# Patient Record
Sex: FEMALE | Race: WHITE | Employment: UNEMPLOYED | URBAN - METROPOLITAN AREA
[De-identification: names, ages, dates, MRNs, and addresses within clinical notes are randomized per-mention and may not be internally consistent; named-entity substitution may affect disease eponyms.]

---

## 2017-06-12 ENCOUNTER — TELEPHONE (OUTPATIENT)
Dept: SURGERY | Age: 62
End: 2017-06-12

## 2017-06-20 ENCOUNTER — OFFICE VISIT (OUTPATIENT)
Dept: FAMILY MEDICINE CLINIC | Age: 62
End: 2017-06-20

## 2017-06-20 VITALS
TEMPERATURE: 98.2 F | BODY MASS INDEX: 47.66 KG/M2 | HEIGHT: 63 IN | DIASTOLIC BLOOD PRESSURE: 66 MMHG | WEIGHT: 269 LBS | OXYGEN SATURATION: 98 % | SYSTOLIC BLOOD PRESSURE: 115 MMHG | HEART RATE: 68 BPM | RESPIRATION RATE: 18 BRPM

## 2017-06-20 DIAGNOSIS — E66.01 MORBID OBESITY DUE TO EXCESS CALORIES (HCC): ICD-10-CM

## 2017-06-20 DIAGNOSIS — E11.9 CONTROLLED TYPE 2 DIABETES MELLITUS WITHOUT COMPLICATION, WITHOUT LONG-TERM CURRENT USE OF INSULIN (HCC): ICD-10-CM

## 2017-06-20 DIAGNOSIS — I10 ESSENTIAL HYPERTENSION, BENIGN: Primary | ICD-10-CM

## 2017-06-20 DIAGNOSIS — R00.2 PALPITATIONS: ICD-10-CM

## 2017-06-20 DIAGNOSIS — Z13.31 DEPRESSION SCREEN: ICD-10-CM

## 2017-06-20 RX ORDER — OLMESARTAN MEDOXOMIL AND HYDROCHLOROTHIAZIDE 20/12.5 20; 12.5 MG/1; MG/1
1 TABLET ORAL DAILY
Qty: 90 TAB | Refills: 1 | Status: SHIPPED | OUTPATIENT
Start: 2017-06-20 | End: 2017-11-14

## 2017-06-20 NOTE — PATIENT INSTRUCTIONS
Please engage in regular exercise. Moderate intensity aerobic exercise  (60-70% maximal heart rate) for at least 150 minutes per week spread over a minimum of 3 days. Do not take more than 2 days off from exercising. Activities such as brisk walking, jogging, bicycling and swimming. Please also engage in weight resistance exercises for at least 20 minutes twice a week. Keep a low carbohydrate diet. 40% of your daily caloric intake. Also a high fiber diet of 30 grams daily. DASH Diet: Care Instructions  Your Care Instructions  The DASH diet is an eating plan that can help lower your blood pressure. DASH stands for Dietary Approaches to Stop Hypertension. Hypertension is high blood pressure. The DASH diet focuses on eating foods that are high in calcium, potassium, and magnesium. These nutrients can lower blood pressure. The foods that are highest in these nutrients are fruits, vegetables, low-fat dairy products, nuts, seeds, and legumes. But taking calcium, potassium, and magnesium supplements instead of eating foods that are high in those nutrients does not have the same effect. The DASH diet also includes whole grains, fish, and poultry. The DASH diet is one of several lifestyle changes your doctor may recommend to lower your high blood pressure. Your doctor may also want you to decrease the amount of sodium in your diet. Lowering sodium while following the DASH diet can lower blood pressure even further than just the DASH diet alone. Follow-up care is a key part of your treatment and safety. Be sure to make and go to all appointments, and call your doctor if you are having problems. It's also a good idea to know your test results and keep a list of the medicines you take. How can you care for yourself at home? Following the DASH diet  · Eat 4 to 5 servings of fruit each day.  A serving is 1 medium-sized piece of fruit, ½ cup chopped or canned fruit, 1/4 cup dried fruit, or 4 ounces (½ cup) of fruit juice. Choose fruit more often than fruit juice. · Eat 4 to 5 servings of vegetables each day. A serving is 1 cup of lettuce or raw leafy vegetables, ½ cup of chopped or cooked vegetables, or 4 ounces (½ cup) of vegetable juice. Choose vegetables more often than vegetable juice. · Get 2 to 3 servings of low-fat and fat-free dairy each day. A serving is 8 ounces of milk, 1 cup of yogurt, or 1 ½ ounces of cheese. · Eat 6 to 8 servings of grains each day. A serving is 1 slice of bread, 1 ounce of dry cereal, or ½ cup of cooked rice, pasta, or cooked cereal. Try to choose whole-grain products as much as possible. · Limit lean meat, poultry, and fish to 2 servings each day. A serving is 3 ounces, about the size of a deck of cards. · Eat 4 to 5 servings of nuts, seeds, and legumes (cooked dried beans, lentils, and split peas) each week. A serving is 1/3 cup of nuts, 2 tablespoons of seeds, or ½ cup of cooked beans or peas. · Limit fats and oils to 2 to 3 servings each day. A serving is 1 teaspoon of vegetable oil or 2 tablespoons of salad dressing. · Limit sweets and added sugars to 5 servings or less a week. A serving is 1 tablespoon jelly or jam, ½ cup sorbet, or 1 cup of lemonade. · Eat less than 2,300 milligrams (mg) of sodium a day. If you limit your sodium to 1,500 mg a day, you can lower your blood pressure even more. Tips for success  · Start small. Do not try to make dramatic changes to your diet all at once. You might feel that you are missing out on your favorite foods and then be more likely to not follow the plan. Make small changes, and stick with them. Once those changes become habit, add a few more changes. · Try some of the following:  ¨ Make it a goal to eat a fruit or vegetable at every meal and at snacks. This will make it easy to get the recommended amount of fruits and vegetables each day. ¨ Try yogurt topped with fruit and nuts for a snack or healthy dessert.   ¨ Add lettuce, tomato, cucumber, and onion to sandwiches. ¨ Combine a ready-made pizza crust with low-fat mozzarella cheese and lots of vegetable toppings. Try using tomatoes, squash, spinach, broccoli, carrots, cauliflower, and onions. ¨ Have a variety of cut-up vegetables with a low-fat dip as an appetizer instead of chips and dip. ¨ Sprinkle sunflower seeds or chopped almonds over salads. Or try adding chopped walnuts or almonds to cooked vegetables. ¨ Try some vegetarian meals using beans and peas. Add garbanzo or kidney beans to salads. Make burritos and tacos with mashed quintana beans or black beans. Where can you learn more? Go to http://maraym-vinh.info/. Enter G580 in the search box to learn more about \"DASH Diet: Care Instructions. \"  Current as of: April 3, 2017  Content Version: 11.3  © 8689-0464 [employer]. Care instructions adapted under license by [employer] (which disclaims liability or warranty for this information). If you have questions about a medical condition or this instruction, always ask your healthcare professional. [place] [address] any warranty or liability for your use of this information.

## 2017-06-20 NOTE — PROGRESS NOTES
Establish Care; Diabetes; and Hernia (Non Specific)        HPI: Blake Soares is a 64 y.o. female WHITE [place]   Here to establish care. She has hx of diabetes for 10 years as well as essential hypertension. She denies numbness/tingling of the extremities, denies polyuria/dipsia. Last set of labs was 1 year ago, reports A1c then was 6%. She is using byetta twice daily but would like to change to something less frequent. She only checks her blood sugars once a month. Formerly on Metformin but was told by a previous doctor she should never be on it due to a lab abnormality. She had been on lipitor in the past but stopped this as her cholesterol was well controlled and she did not feel that she needed it. She is using her benicar HCT 1/2 tablet daily as she was getting dizzy on the full pill. She is planned to have large abdominal abdominal hernia repair with Dr Mac Shukla. She has difficulty exercising regularly due to the large hernia. She understands her weight is significantly more than it should be. She is a vegetarian and does not feel her weight is due to her diet but more so due to inactivity. She does have hx of Gastric Bypass- she had Erin-en-Y surgery, 10 years ago with Dr Pablo Ta. Past Medical History:   Diagnosis Date    Cancer ([facility] [address]     ovarian    Chest tightness     Diabetes (HCC)     Hypertension        Current Outpatient Prescriptions   Medication Sig    exenatide (BYETTA) 10 mcg/0.04 mL pnij injection 0.04 mL by SubCUTAneous route Before breakfast and dinner.  olmesartan-hydrochlorothiazide (BENICAR HCT) 40-25 mg per tablet Take 1 Tab by mouth daily. No current facility-administered medications for this visit.         Allergies   Allergen Reactions    Aspirin Hives    Codeine Hives    Erythromycin Hives       Past Surgical History:   Procedure Laterality Date    GASTRIC BYPASS,OBESE<150CM ERIN-EN-Y  2007    RADICAL ABD HYSTEREC+PELV NODES  2007 Family History   Problem Relation Age of Onset    Diabetes Mother     Cancer Mother     Diabetes Father     Cancer Father        Social History     Social History    Marital status:      Spouse name: N/A    Number of children: N/A    Years of education: N/A     Occupational History    Not on file. Social History Main Topics    Smoking status: Never Smoker    Smokeless tobacco: Never Used    Alcohol use No    Drug use: No    Sexual activity: No     Other Topics Concern    Not on file     Social History Narrative     Gen- No weight loss, No Malaise, No fatigue  Eyes- No dipoplia, blurry vision  [facility]- No Chest pain, she reports every now and then that she gets feelings of fluttering heart,  Lasts about 5-6 minutes 1 x/ week, can occur at rest and with activity, been present for the last 2 years however seems to be getting more frequent. Resp- No Cough, ANNE, Wheezing  Neuro- No headaches  Skin- No rashes, she does report some increased bruising. Visit Vitals    /66 (BP 1 Location: Right arm, BP Patient Position: Sitting)    Pulse 68    Temp 98.2 °F (36.8 °C) (Oral)    Resp 18    Ht 5' 3.39\" (1.61 m)    Wt 269 lb (122 kg)    SpO2 98%    BMI 47.07 kg/m2       Physical Exam   Constitutional: She appears well-developed and well-nourished. No distress. HENT:   Head: Normocephalic and atraumatic. Right Ear: External ear normal.   Left Ear: External ear normal.   Cardiovascular: Normal rate, regular rhythm and normal heart sounds. No murmur heard. Pulmonary/Chest: Effort normal and breath sounds normal. No respiratory distress. She has no wheezes. She has no rales. Abdominal: Soft. Bowel sounds are normal.   Large ventral hernia noted, easily reducible. Non-tender   Musculoskeletal: She exhibits edema (2+ pitting edema bilateral lower legs). Neurological: She is alert. Gait normal.   Skin: Skin is warm and dry. No rash noted. She is not diaphoretic.  No erythema. No pallor. Psychiatric: She has a normal mood and affect. Her behavior is normal. Judgment and thought content normal.   Nursing note and vitals reviewed. EKG: normal EKG, normal sinus rhythm, there are no previous tracings available for comparison. Assesment:  1. Essential hypertension, benign  Well controlled. Will half the dose of benicar due to dizziness on previous dosing  - METABOLIC PANEL, COMPREHENSIVE; Future  - LIPID PANEL; Future  - HEMOGLOBIN A1C WITH EAG; Future  - olmesartan-hydroCHLOROthiazide (BENICAR HCT) 20-12.5 mg per tablet; Take 1 Tab by mouth daily. Dispense: 90 Tab; Refill: 1    2. Controlled type 2 diabetes mellitus without complication, without long-term current use of insulin ([address]  Discussed use of trulicity in the future. Will await results of lab testing  - METABOLIC PANEL, COMPREHENSIVE; Future  - LIPID PANEL; Future  - MICROALBUMIN, UR, RAND W/ MICROALBUMIN/CREA RATIO; Future  - HEMOGLOBIN A1C WITH EAG; Future    3. Palpitations  Normal EKG, will rule out metabolic causes. Patient may need Holter  - AMB POC EKG ROUTINE W/ 12 LEADS, INTER & REP  - TSH 3RD GENERATION; Future  - CBC W/O DIFF; Future    4. Morbid obesity due to excess calories ([address]  I have reviewed/discussed the above normal BMI with the patient. I have recommended the following interventions: encourage exercise . Odette Wasserman 5. Depression screen  PHQ over the last two weeks 6/20/2017   Little interest or pleasure in doing things Not at all   Feeling down, depressed or hopeless Not at all   Total Score PHQ 2 0       - [address] [employer]        I have discussed the diagnosis with the patient and the intended management  The patient has received an after-visit summary and questions were answered concerning future plans. I have discussed medication usage, side effects and warnings with the patient as well. I have reviewed the plan of care with the patient, accepted their input and they are in agreement with the treatment goals. Follow-up Disposition:  Return for follow up pending labs.       Kashif Zelaya MD                .

## 2017-06-20 NOTE — MR AVS SNAPSHOT
Visit Information Date & Time Provider Department Dept. Phone Encounter #  
 6/20/2017  9:00 AM Romeo Miller, [address] [phone] [id number] Follow-up Instructions Return for follow up pending labs. Upcoming Health Maintenance Date Due Hepatitis C Screening 1955 FOOT EXAM Q1 7/28/1965 MICROALBUMIN Q1 7/28/1965 EYE EXAM RETINAL OR DILATED Q1 7/28/1965 Pneumococcal 19-64 Highest Risk (1 of 3 - PCV13) 7/28/1974 DTaP/Tdap/Td series (1 - Tdap) 7/28/1976 PAP AKA CERVICAL CYTOLOGY 7/28/1976 FOBT Q 1 YEAR AGE 50-75 7/28/2005 LIPID PANEL Q1 6/7/2012 HEMOGLOBIN A1C Q6M 9/21/2012 ZOSTER VACCINE AGE 60> 7/28/2015 INFLUENZA AGE 9 TO ADULT 8/1/2017 BREAST CANCER SCRN MAMMOGRAM 5/8/2019 Allergies as of 6/20/2017  Review Complete On: 6/20/2017 By: Darcie Rosario LPN Severity Noted Reaction Type Reactions Aspirin  05/25/2010    Hives Codeine  05/25/2010    Hives Erythromycin  05/25/2010    Hives Metformin  06/20/2017    Unknown (comments) Adverse reaction, lab abnormality- non-specific complications Current Immunizations  Never Reviewed No immunizations on file. Not reviewed this visit You Were Diagnosed With   
  
 Codes Comments Essential hypertension, benign    -  Primary ICD-10-CM: I10 
ICD-9-CM: 401.1 Controlled type 2 diabetes mellitus without complication, without long-term current use of insulin ([facility]     ICD-10-CM: E11.9 ICD-9-CM: 250.00 Palpitations     ICD-10-CM: R00.2 ICD-9-CM: 785.1 Depression screen     ICD-10-CM: Z13.89 ICD-9-CM: V79.0 Vitals BP Pulse Temp Resp Height(growth percentile) Weight(growth percentile)  
 115/66 (BP 1 Location: Right arm, BP Patient Position: Sitting) 68 98.2 °F (36.8 °C) (Oral) 18 5' 3.39\" (1.61 m) 269 lb (122 kg) SpO2 BMI OB Status Smoking Status 98% 47.07 kg/m2 Hysterectomy Never Smoker Vitals History BMI and BSA Data Body Mass Index Body Surface Area 47.07 kg/m 2 2.34 m 2 Preferred Pharmacy Pharmacy Name Phone [address] [phone] Your Updated Medication List  
  
   
This list is accurate as of: 6/20/17 10:31 AM.  Always use your most recent med list.  
  
  
  
  
 exenatide 10 mcg/dose(250 mcg/mL) 2.4 mL Pnij injection Commonly known as:  BYETTA  
0.04 mL by SubCUTAneous route Before breakfast and dinner. olmesartan-hydroCHLOROthiazide 20-12.5 mg per tablet Commonly known as:  BENICAR HCT Take 1 Tab by mouth daily. Prescriptions Printed Refills  
 olmesartan-hydroCHLOROthiazide (BENICAR HCT) 20-12.5 mg per tablet 1 Sig: Take 1 Tab by mouth daily. Class: Print Route: Oral  
  
We Performed the Following AMB POC EKG ROUTINE W/ 12 LEADS, INTER & REP [66795 CPT(R)] 99959 [facility] [ \[facility]\""] Follow-up Instructions Return for follow up pending labs. To-Do List   
 06/20/2017 Lab:  CBC W/O DIFF   
  
 06/20/2017 Lab:  HEMOGLOBIN A1C WITH EAG   
  
 06/20/2017 Lab:  LIPID PANEL   
  
 06/20/2017 Lab:  METABOLIC PANEL, COMPREHENSIVE   
  
 06/20/2017 Lab:  MICROALBUMIN, UR, RAND W/ MICROALBUMIN/CREA RATIO   
  
 06/20/2017 Lab:  TSH 3RD GENERATION Patient Instructions Please engage in regular exercise. Moderate intensity aerobic exercise  (60-70% maximal heart rate) for at least 150 minutes per week spread over a minimum of 3 days. Do not take more than 2 days off from exercising. Activities such as brisk walking, jogging, bicycling and swimming. Please also engage in weight resistance exercises for at least 20 minutes twice a week. Keep a low carbohydrate diet. 40% of your daily caloric intake. Also a high fiber diet of 30 grams daily. DASH Diet: Care Instructions Your Care Instructions The DASH diet is an eating plan that can help lower your blood pressure. DASH stands for Dietary Approaches to Stop Hypertension. Hypertension is high blood pressure. The DASH diet focuses on eating foods that are high in calcium, potassium, and magnesium. These nutrients can lower blood pressure. The foods that are highest in these nutrients are fruits, vegetables, low-fat dairy products, nuts, seeds, and legumes. But taking calcium, potassium, and magnesium supplements instead of eating foods that are high in those nutrients does not have the same effect. The DASH diet also includes whole grains, fish, and poultry. The DASH diet is one of several lifestyle changes your doctor may recommend to lower your high blood pressure. Your doctor may also want you to decrease the amount of sodium in your diet. Lowering sodium while following the DASH diet can lower blood pressure even further than just the DASH diet alone. Follow-up care is a key part of your treatment and safety. Be sure to make and go to all appointments, and call your doctor if you are having problems. It's also a good idea to know your test results and keep a list of the medicines you take. How can you care for yourself at home? Following the DASH diet · Eat 4 to 5 servings of fruit each day. A serving is 1 medium-sized piece of fruit, ½ cup chopped or canned fruit, 1/4 cup dried fruit, or 4 ounces (½ cup) of fruit juice. Choose fruit more often than fruit juice. · Eat 4 to 5 servings of vegetables each day. A serving is 1 cup of lettuce or raw leafy vegetables, ½ cup of chopped or cooked vegetables, or 4 ounces (½ cup) of vegetable juice. Choose vegetables more often than vegetable juice. · Get 2 to 3 servings of low-fat and fat-free dairy each day. A serving is 8 ounces of milk, 1 cup of yogurt, or 1 ½ ounces of cheese. · Eat 6 to 8 servings of grains each day.  A serving is 1 slice of bread, 1 ounce of dry cereal, or ½ cup of cooked rice, pasta, or cooked cereal. Try to choose whole-grain products as much as possible. · Limit lean meat, poultry, and fish to 2 servings each day. A serving is 3 ounces, about the size of a deck of cards. · Eat 4 to 5 servings of nuts, seeds, and legumes (cooked dried beans, lentils, and split peas) each week. A serving is 1/3 cup of nuts, 2 tablespoons of seeds, or ½ cup of cooked beans or peas. · Limit fats and oils to 2 to 3 servings each day. A serving is 1 teaspoon of vegetable oil or 2 tablespoons of salad dressing. · Limit sweets and added sugars to 5 servings or less a week. A serving is 1 tablespoon jelly or jam, ½ cup sorbet, or 1 cup of lemonade. · Eat less than 2,300 milligrams (mg) of sodium a day. If you limit your sodium to 1,500 mg a day, you can lower your blood pressure even more. Tips for success · Start small. Do not try to make dramatic changes to your diet all at once. You might feel that you are missing out on your favorite foods and then be more likely to not follow the plan. Make small changes, and stick with them. Once those changes become habit, add a few more changes. · Try some of the following: ¨ Make it a goal to eat a fruit or vegetable at every meal and at snacks. This will make it easy to get the recommended amount of fruits and vegetables each day. ¨ Try yogurt topped with fruit and nuts for a snack or healthy dessert. ¨ Add lettuce, tomato, cucumber, and onion to sandwiches. ¨ Combine a ready-made pizza crust with low-fat mozzarella cheese and lots of vegetable toppings. Try using tomatoes, squash, spinach, broccoli, carrots, cauliflower, and onions. ¨ Have a variety of cut-up vegetables with a low-fat dip as an appetizer instead of chips and dip. ¨ Sprinkle sunflower seeds or chopped almonds over salads. Or try adding chopped walnuts or almonds to cooked vegetables. ¨ Try some vegetarian meals using beans and peas. Add garbanzo or kidney beans to salads. Make burritos and tacos with mashed quintana beans or black beans. Where can you learn more? Go to http://maryam-vinh.info/. Enter Q511 in the search box to learn more about \"DASH Diet: Care Instructions. \" Current as of: April 3, 2017 Content Version: 11.3 © 5690-0330 [employer]. Care instructions adapted under license by [employer] (which disclaims liability or warranty for this information). If you have questions about a medical condition or this instruction, always ask your healthcare professional. [address] any warranty or liability for your use of this information. Introducing [facility]! Laron Copeland introduces UpNext patient portal. Now you can access parts of your medical record, email your doctor's office, and request medication refills online. 1. In your internet browser, go to [email or web address] 2. Click on the First Time User? Click Here link in the Sign In box. You will see the New Member Sign Up page. 3. Enter your UpNext Access Code exactly as it appears below. You will not need to use this code after youve completed the sign-up process. If you do not sign up before the expiration date, you must request a new code. · UpNext Access Code: [email or web address] Expires: 8/6/2017  4:49 PM 
 
4. Enter the last four digits of your Social Security Number (xxxx) and Date of Birth (mm/dd/yyyy) as indicated and click Submit. You will be taken to the next sign-up page. 5. Create a UpNext ID. This will be your UpNext login ID and cannot be changed, so think of one that is secure and easy to remember. 6. Create a UpNext password. You can change your password at any time. 7. Enter your Password Reset Question and Answer. This can be used at a later time if you forget your password. 8. Enter your e-mail address. You will receive e-mail notification when new information is available in [address] 9. Click Sign Up. You can now view and download portions of your medical record. 10. Click the Download Summary menu link to download a portable copy of your medical information. If you have questions, please visit the Frequently Asked Questions section of the [employer] website. Remember, [employer] is NOT to be used for urgent needs. For medical emergencies, dial 911. Now available from your iPhone and Android! Please provide this summary of care documentation to your next provider. Your primary care clinician is listed as Lelon John. If you have any questions after today's visit, please call [phone].

## 2017-06-21 ENCOUNTER — HOSPITAL ENCOUNTER (OUTPATIENT)
Dept: LAB | Age: 62
Discharge: HOME | End: 2017-06-21
Payer: OTHER GOVERNMENT

## 2017-06-21 ENCOUNTER — CLINICAL SUPPORT (OUTPATIENT)
Dept: FAMILY MEDICINE CLINIC | Age: 62
End: 2017-06-21

## 2017-06-21 DIAGNOSIS — E11.9 CONTROLLED TYPE 2 DIABETES MELLITUS WITHOUT COMPLICATION, WITHOUT LONG-TERM CURRENT USE OF INSULIN (HCC): ICD-10-CM

## 2017-06-21 DIAGNOSIS — R00.2 PALPITATIONS: ICD-10-CM

## 2017-06-21 DIAGNOSIS — I10 ESSENTIAL HYPERTENSION, BENIGN: ICD-10-CM

## 2017-06-21 DIAGNOSIS — I10 ESSENTIAL HYPERTENSION, BENIGN: Primary | ICD-10-CM

## 2017-06-21 LAB
ALBUMIN SERPL BCP-MCNC: 3.2 G/DL (ref 3.4–5)
ALBUMIN/GLOB SERPL: 0.8 {RATIO} (ref 0.8–1.7)
ALP SERPL-CCNC: 113 U/L (ref 45–117)
ALT SERPL-CCNC: 28 U/L (ref 13–56)
ANION GAP BLD CALC-SCNC: 10 MMOL/L (ref 3–18)
AST SERPL W P-5'-P-CCNC: 43 U/L (ref 15–37)
BILIRUB SERPL-MCNC: 0.5 MG/DL (ref 0.2–1)
BUN SERPL-MCNC: 16 MG/DL (ref 7–18)
BUN/CREAT SERPL: 18 (ref 12–20)
CALCIUM SERPL-MCNC: 8.5 MG/DL (ref 8.5–10.1)
CHLORIDE SERPL-SCNC: 102 MMOL/L (ref 100–108)
CHOLEST SERPL-MCNC: 190 MG/DL
CO2 SERPL-SCNC: 25 MMOL/L (ref 21–32)
CREAT SERPL-MCNC: 0.88 MG/DL (ref 0.6–1.3)
ERYTHROCYTE [DISTWIDTH] IN BLOOD BY AUTOMATED COUNT: 16.3 % (ref 11.6–14.5)
EST. AVERAGE GLUCOSE BLD GHB EST-MCNC: 217 MG/DL
GLOBULIN SER CALC-MCNC: 3.9 G/DL (ref 2–4)
GLUCOSE SERPL-MCNC: 192 MG/DL (ref 74–99)
HBA1C MFR BLD: 9.2 % (ref 4.2–5.6)
HCT VFR BLD AUTO: 29.6 % (ref 35–45)
HDLC SERPL-MCNC: 34 MG/DL (ref 40–60)
HDLC SERPL: 5.6 {RATIO} (ref 0–5)
HGB BLD-MCNC: 8.1 G/DL (ref 12–16)
LDLC SERPL CALC-MCNC: 129 MG/DL (ref 0–100)
LIPID PROFILE,FLP: ABNORMAL
MCH RBC QN AUTO: 20.6 PG (ref 24–34)
MCHC RBC AUTO-ENTMCNC: 27.4 G/DL (ref 31–37)
MCV RBC AUTO: 75.3 FL (ref 74–97)
PLATELET # BLD AUTO: 219 K/UL (ref 135–420)
PMV BLD AUTO: 11 FL (ref 9.2–11.8)
POTASSIUM SERPL-SCNC: 4 MMOL/L (ref 3.5–5.5)
PROT SERPL-MCNC: 7.1 G/DL (ref 6.4–8.2)
RBC # BLD AUTO: 3.93 M/UL (ref 4.2–5.3)
SODIUM SERPL-SCNC: 137 MMOL/L (ref 136–145)
TRIGL SERPL-MCNC: 135 MG/DL (ref ?–150)
TSH SERPL DL<=0.05 MIU/L-ACNC: 2.25 UIU/ML (ref 0.36–3.74)
VLDLC SERPL CALC-MCNC: 27 MG/DL
WBC # BLD AUTO: 3.9 K/UL (ref 4.6–13.2)

## 2017-06-21 PROCEDURE — 82043 UR ALBUMIN QUANTITATIVE: CPT | Performed by: FAMILY MEDICINE

## 2017-06-21 PROCEDURE — 80061 LIPID PANEL: CPT | Performed by: FAMILY MEDICINE

## 2017-06-21 PROCEDURE — 80053 COMPREHEN METABOLIC PANEL: CPT | Performed by: FAMILY MEDICINE

## 2017-06-21 PROCEDURE — 83036 HEMOGLOBIN GLYCOSYLATED A1C: CPT | Performed by: FAMILY MEDICINE

## 2017-06-21 PROCEDURE — 84443 ASSAY THYROID STIM HORMONE: CPT | Performed by: FAMILY MEDICINE

## 2017-06-21 PROCEDURE — 85027 COMPLETE CBC AUTOMATED: CPT | Performed by: FAMILY MEDICINE

## 2017-06-21 NOTE — PROGRESS NOTES
Yadi Escamilla is a 64 y.o. female here this morning for labs only. She tolerated well and left showing no s/s of distress.

## 2017-06-22 ENCOUNTER — HOSPITAL ENCOUNTER (OUTPATIENT)
Dept: LAB | Age: 62
Discharge: HOME | End: 2017-06-22
Payer: OTHER GOVERNMENT

## 2017-06-22 ENCOUNTER — CLINICAL SUPPORT (OUTPATIENT)
Dept: FAMILY MEDICINE CLINIC | Age: 62
End: 2017-06-22

## 2017-06-22 DIAGNOSIS — E11.9 CONTROLLED TYPE 2 DIABETES MELLITUS WITHOUT COMPLICATION, WITHOUT LONG-TERM CURRENT USE OF INSULIN (HCC): ICD-10-CM

## 2017-06-22 DIAGNOSIS — I10 ESSENTIAL HYPERTENSION, BENIGN: Primary | ICD-10-CM

## 2017-06-22 DIAGNOSIS — D50.9 MICROCYTIC ANEMIA: Primary | ICD-10-CM

## 2017-06-22 DIAGNOSIS — D50.9 MICROCYTIC ANEMIA: ICD-10-CM

## 2017-06-22 LAB
CREAT UR-MCNC: 46.72 MG/DL (ref 30–125)
FERRITIN SERPL-MCNC: 4 NG/ML (ref 8–388)
MICROALBUMIN UR-MCNC: <0.13 MG/DL (ref 0–3)
MICROALBUMIN/CREAT UR-RTO: NORMAL MG/G (ref 0–30)

## 2017-06-22 PROCEDURE — 83540 ASSAY OF IRON: CPT | Performed by: FAMILY MEDICINE

## 2017-06-22 PROCEDURE — 83550 IRON BINDING TEST: CPT | Performed by: FAMILY MEDICINE

## 2017-06-22 PROCEDURE — 82728 ASSAY OF FERRITIN: CPT | Performed by: FAMILY MEDICINE

## 2017-06-22 NOTE — PROGRESS NOTES
Junior Torres is a 64 y.o. female here this afternoon for labs only. She tolerated well and left showing no s/s of distress.

## 2017-06-22 NOTE — PROGRESS NOTES
Please let Mrs Katie Heaton know that she is markedly anemic, 8.1g/dL, normal is 12. Her diabetes is very poorly controlled, her A1c is 9.2%. Her cholesterol is uncontrolled. Her thyroid and urine tests were nromal. As well as liver, kidney function were normal. I would like her to come in tomorrow to have iron studies completed. Schedule follow up with me early next week.     Thanks,  Mason Cummins MD

## 2017-06-26 RX ORDER — PEN NEEDLE, DIABETIC 30 GX3/16"
NEEDLE, DISPOSABLE MISCELLANEOUS
Qty: 100 PEN NEEDLE | Refills: 0 | Status: SHIPPED | OUTPATIENT
Start: 2017-06-26 | End: 2017-07-03

## 2017-06-26 NOTE — TELEPHONE ENCOUNTER
Will start patient on Victoza, stop use of Byetta.     Lab Results   Component Value Date/Time    Hemoglobin A1c 9.2 06/21/2017 08:25 AM    Hemoglobin A1c (POC) 5.8 03/21/2012 11:35 AM     Karthik Posey MD

## 2017-06-27 ENCOUNTER — TELEPHONE (OUTPATIENT)
Dept: FAMILY MEDICINE CLINIC | Age: 62
End: 2017-06-27

## 2017-06-27 LAB
IRON SATN MFR SERPL: 5 % (ref 15–55)
IRON SERPL-MCNC: 23 UG/DL (ref 27–139)
TIBC SERPL-MCNC: 454 UG/DL (ref 250–450)
UIBC SERPL-MCNC: 431 UG/DL (ref 118–369)

## 2017-06-27 NOTE — PROGRESS NOTES
Called Mrs Elizabeth Torre in regards to dissatisfaction with her feeling that office was not responding to her calls. I informed her that we had promptly electronically sent in her [facility] despite the fact that I had preferred that she use a local pharmacy for the first month to assess tolerability. We received denial notice today of the Victoza and that Bydureon was their preferred medication. This was to be sent in by the end of the day. II was informed by  My  that patient had sent email that she was dissatisfied with promptness of response. I called patient to address these matters. I let her know that her requests were dealt with a prompt manner and that denial of her victoza was not a result of our actions but rather her insurances preference with drug coverage. I informed her that I will be sending in [address] today. At this point she stated that she would rather continue on her byetta even though at her appointment she wanted to reduce her frequency of injections. She also informed me today that she has been out of her byetta for the last 5 days. This was not relayed to anyone during her multiple office calls nor did she state that she was almost out of Byetta at her appointment with me last week 6/20/17. She had follow up with me due to her marked iron deficient anemia. She cancelled this however due to reports of \"breaking\" her toes yesterday. She did not seek medical attention for this. I advised that she start Iron sulfate supplementation on an empty stomach twice daily. Take this with orange juice or Vitamin C for better absorption. I asked also that she check her blood sugars daily. She stated she would do this. She stated she had not been taking care of herself due to taking care of demented .  I relayed to her that her poorly controlled diabetes and anemia does not happen overnight unless int the latter case she has had significant bleeding, bruising which she denies. I asked that she follow up in 2 weeks. 1. Uncontrolled type 2 diabetes mellitus without complication, without long-term current use of insulin (HCC)    - exenatide (BYETTA) 10 mcg/dose(250 mcg/mL) 2.4 mL pnij injection; 0.04 mL by SubCUTAneous route Before breakfast and dinner.  QS 3 months  Dispense: 7.2 mL; Refill: 0    Kristal Vargas MD  6/27/2017, 4:34 PM  [address]

## 2017-06-27 NOTE — TELEPHONE ENCOUNTER
Garett needed a prior auth, it was denied, they want her to try Bydureon first. If Stuartalexi Bharathirich is approved, she will have a $20 copay for a 90-day supply [facility] and a $24 copay for a 1 month supply from a local pharmacy.

## 2017-06-28 ENCOUNTER — DOCUMENTATION ONLY (OUTPATIENT)
Dept: FAMILY MEDICINE CLINIC | Age: 62
End: 2017-06-28

## 2017-06-28 NOTE — PROGRESS NOTES
Phone call in from individual stating he was Irvin Foreman;  of patient. He's calling to make us aware that he is \"sending a letter off to the [facility], and this is your heads up in case you get some nasty calls; and so what\". He then disconnected the call abruptly.

## 2017-06-29 ENCOUNTER — TELEPHONE (OUTPATIENT)
Dept: FAMILY MEDICINE CLINIC | Age: 62
End: 2017-06-29

## 2017-06-29 NOTE — TELEPHONE ENCOUNTER
Per Dr. Oral Paredes, called pt to inform her that her insurance is not covering her Byetta, they want her to try Bydureon or Tanzeum first. Left message for pt to return call.

## 2017-06-29 NOTE — TELEPHONE ENCOUNTER
Pt returned call and is upset with our office. She states that she is aware of the medication issue and that per her insurance company they sent us a form for the medication 3 days ago and never received a response. I explained to her that this is because, according to the form, they will not cover her Byetta, they want her to try two other meds first which she has not told us which she wants to try. Pt continued to insist that we did not do what we were supposed to do and that she should not have had to call us multiple times trying to \"track down a medication\". Pt then switched topics and said to let Dr. Oral Paredes know that she is also upset about the paper she received at her last visit (after visit summary). There were four pages about a DASH diet but nothing in regards to high blood pressure and diabetes; this made her feel that he only cares about her weight. Explained to pt that the DASH diet is to help with high blood pressure, not weight. She did not accept that and stated she doesn't feel she needs to keep this doctor and will not be returning here. Call ended. Pt never raised her voiced during the call but did express how upset she was multiple times.

## 2018-04-19 PROBLEM — K43.2 RECURRENT VENTRAL INCISIONAL HERNIA: Status: ACTIVE | Noted: 2018-04-19

## 2018-11-07 ENCOUNTER — OFFICE VISIT (OUTPATIENT)
Dept: HEMATOLOGY | Age: 63
End: 2018-11-07

## 2018-11-07 VITALS
RESPIRATION RATE: 16 BRPM | HEIGHT: 64 IN | BODY MASS INDEX: 43.36 KG/M2 | HEART RATE: 63 BPM | DIASTOLIC BLOOD PRESSURE: 75 MMHG | SYSTOLIC BLOOD PRESSURE: 137 MMHG | TEMPERATURE: 98.9 F | OXYGEN SATURATION: 96 % | WEIGHT: 254 LBS

## 2018-11-07 DIAGNOSIS — K74.60 CIRRHOSIS OF LIVER WITHOUT ASCITES, UNSPECIFIED HEPATIC CIRRHOSIS TYPE (HCC): Primary | ICD-10-CM

## 2018-11-07 PROBLEM — Z87.19 H/O VENTRAL HERNIA REPAIR: Status: ACTIVE | Noted: 2018-11-07

## 2018-11-07 PROBLEM — K43.2 RECURRENT VENTRAL INCISIONAL HERNIA: Status: RESOLVED | Noted: 2018-04-19 | Resolved: 2018-11-07

## 2018-11-07 PROBLEM — Z98.890 H/O VENTRAL HERNIA REPAIR: Status: ACTIVE | Noted: 2018-11-07

## 2018-11-07 PROBLEM — E78.00 HYPERCHOLESTEREMIA: Status: ACTIVE | Noted: 2018-11-07

## 2018-11-07 PROBLEM — E66.01 OBESITY, MORBID (HCC): Status: ACTIVE | Noted: 2018-11-07

## 2018-11-07 PROBLEM — Z98.84 H/O GASTRIC BYPASS: Status: ACTIVE | Noted: 2018-11-07

## 2018-11-07 NOTE — PROGRESS NOTES
[address] [facility] Quita Alexander MD, [place] BarrettSTANNER Smith, NP Gerome Halsted, TEDDY Ortez, [facility]   Salima Amin, NP Mando Toledo, OWEN Desir [facility] [address] 
  at [address], [facility] Brady Adams  22. 
  [phone] FAX: [address] 
  [facility] 
  [address], [address] 98 [facility], [address] 
  [phone] FAX: [phone] Patient Care Team: 
None as PCP - Brittany Benjamin MD as Physician (Surgery) Problem List  Date Reviewed: 2/17/2019 Codes Class Noted [facility] ICD-10-CM: K74.60 ICD-9-CM: 571.5  2/17/2019 YOUSIF (nonalcoholic steatohepatitis) ICD-10-CM: N26.89 ICD-9-CM: 571.8  2/17/2019 Obesity, morbid ([facility] [address] ICD-10-CM: E66.01 
ICD-9-CM: 278.01  11/7/2018 Hypercholesteremia ICD-10-CM: E78.00 ICD-9-CM: 272.0  11/7/2018 H/O gastric bypass ICD-10-CM: Z98.84 ICD-9-CM: V45.86  11/7/2018 H/O ventral hernia repair ICD-10-CM: Z98.890, Z87.19 ICD-9-CM: V15.29  11/7/2018 History of ovarian cancer ICD-10-CM: Z85.43 
ICD-9-CM: V10.43  6/7/2011 Essential hypertension, benign ICD-10-CM: I10 
ICD-9-CM: 401.1  5/25/2010 Type II or unspecified type diabetes mellitus without mention of complication, not stated as uncontrolled ICD-10-CM: E11.9 ICD-9-CM: 250.00  5/25/2010 The clinicians listed above have asked me to see Kimberly Alicia in consultation regarding management of cirrhosis secondary to YOUSIF. All medical records sent by the referring physicians were reviewed including imaging studies and pathology. The patient is a 61 y.o.  female who was found to have chronic liver disease and cirrhosis in 4/2018 when she underwent ventral hernia repair. Merrill Rose A liver biopsy was performed in 4/2018. This demonstrated fatty liver and cirrhosis. Serologic evaluation for markers of chronic liver disease has either not been performed or the results are not available to me. The patient has not developed any of the major complications of cirrhosis to date. The patient has no symptoms which can be attributed to the liver disorder. The patient has not experienced fatigue, pain in the right side over the liver, problems concentrating, swelling of the abdomen, swelling of the lower extremities, hematemesis,  
hematochezia. The patient completes all daily activities without any functional limitations. ASSESSMENT AND PLAN: 
Cirrhosis Cirrhosis is likely secondary to YOUSIF. The diagnosis of cirrhosis is based upon liver biopsy in 4/2018 Serologic testing for causes of chronic liver disease were ordered. AST is elevated. ALT is normal.  ALP is normal.  Liver function is normal.  Serum albumin is  
depressed. INR is prolonged. The platelet count is normal/depressed. The CTP is 8. Child class B. The MELD score is 9. The patient has cirrhosis but does not require liver transplant evaluation testing at this time because the MELD score is low and there have been no complications of cirrhosis to date. YOUSIF Suspect the patient has fatty liver based upon liver biopsy, features of metabolic syndrome, Will obtain serologic testing to exclude other causes of chronic liver disease. Will perform laboratory testing to monitor liver function and degree of liver injury. This included BMP, hepatic panel, CBC with platelet count, INR. There is currently no FDA approved medical treatment for fatty liver, NALFD or YOUSIF. The patient was counseled regarding diet and exercise to achieve weight loss.   The best diet for patients with fatty liver is one very low in carbohydrates and enriched with protein such as an Ila's program.   
 
The patient was told to consume any food products and drinks containing fructose as this enhances hepatic fat synthesis. There is no medication or vitamin supplements that we advocate for YOUSIF. Using glitazones in patients without diabetes mellitus has been shown to reduce fat content in the liver but has no effect on fibrosis and is associated with weight gain. Vitamin E has also been used but the data is not very good and most experts no longer advocate this. Screening for Esophageal varices The patient has not had an EGD to screen for varices. Will schedule for EGD to assess for varices and need for banding. Hepatic encephalopathy This has not developed to date. There is no need for treatment with lactulose and/or Xifaxan at this time. No need to restrict dietary protein at this time. Thrombocytopenia This is secondary to cirrhosis. There is no evidence of overt bleeding. No treatment is required. The platelet count is adequate for the patient to undergo procedures without the need for platelet transfusion or platelet growth factors. Anemia This is likely due to gastric bypass surgery and FE malabsorption Will obtain FE panel to assess for iron stores. The last serum ferritin was in 2017 and depressed The last FE saturation was in 2017 and depressed The patient will not respond to oral FE beasue of the gastric bypass and will likely need intravenous iron. Screening for Hepatocellular Carcinoma [address] screening has not been not been performed AFP was ordered today and ultrasound will be scheduled. Treatment of other medical problems in patients with chronic liver disease There are no contraindications for the patient to take most medications that are necessary for treatment of other medical issues. The patient has cirrhrosis and should avoid the following medications: Benzodiazapines which could exacerbate HE. 
NSAIDs which are associated with a higher rate of developing MAURICE. The patient can take the following medications as these will not impact the patient's current liver disease. Any medications utilized for treatment of DM Statins to treat hypercholesterolemia Counseling for alcohol in patients with chronic liver disease The patient has cirrhosis and was advised to be abstinent from all alcohol including non-alcoholic beer which does contain some alcohol. The patient does not consume any significant amount of alcohol. Osteoporosis The risk of osteoporosis is increased in patients with cirrhosis. DEXA bone density to assess for osteoporosis has not been performed. This should be ordered by the patients primary care physician. Vaccinations The need for vaccination against viral hepatitis A and B will be assessed with serologic and instituted as appropriate. Routine vaccinations against other bacterial and viral agents can be performed as indicated. Annual flu vaccination should be administered if indicated. ALLERGIES Allergies Allergen Reactions  Metformin Anaphylaxis Adverse reaction, lab abnormality- non-specific complications  Aspirin Hives  Byetta [Exenatide] Other (comments) Foaming at the mouth  Codeine Hives  Erythromycin Hives MEDICATIONS Current Outpatient Medications Medication Sig  MILK THISTLE PO Take  by mouth.  DANDELION ROOT PO Take  by mouth.  TURMERIC PO Take  by mouth.  LECITHIN PO Take  by mouth.  APPLE CIDER VINEGAR PO Take  by mouth.  garlic 1 mg cap Take 1 Cap by mouth daily. Indications: stopped 4-6-18 for surgery on 4-19-18  cholecalciferol (VITAMIN D3) 1,000 unit tablet Take 800 Units by mouth daily. Indications: stopped 4-6-18, for surgery  olmesartan-hydroCHLOROthiazide (BENICAR HCT) 40-25 mg per tablet Take 1 Tab by mouth daily.  oxyCODONE-acetaminophen (PERCOCET) 7.5-325 mg per tablet Take 1 Tab by mouth every four (4) hours as needed for Pain. Max Daily Amount: 6 Tabs.  CHROMIUM PICOLINATE PO Take 1 Tab by mouth daily. Indications: stopped 18, for surgery on 18 No current facility-administered medications for this visit. SYSTEM REVIEW NOT RELATED TO LIVER DISEASE OR REVIEWED ABOVE: 
Constitution systems: Negative for fever, chills, weight gain, weight loss. Eyes: Negative for visual changes. ENT: Negative for sore throat, painful swallowing. Respiratory: Negative for cough, hemoptysis, SOB. Cardiology: Negative for chest pain, palpitations. GI:  Negative for constipation or diarrhea. : Negative for urinary frequency, dysuria, hematuria, nocturia. Skin: Negative for rash. Hematology: Negative for easy bruising, blood clots. Musculo-skelatal: Negative for back pain, muscle pain, weakness. Neurologic: Negative for headaches, dizziness, vertigo, memory problems not related to HE. Psychology: Negative for anxiety, depression. FAMILY HISTORY: 
The father  of lung cancer. The mother  of MVA. There is a brother with cirrhosis SOCIAL HISTORY: 
The patient is . The patient has 2 children, and 3 grandchildren. The patient has never used tobacco products. The patient has never consumed significant amounts of alcohol. The patient used to work as a . The patient retired in [address] [place] [address]. PHYSICAL EXAMINATION: 
Visit Vitals /75 (BP 1 Location: Right arm, BP Patient Position: Sitting) Pulse 63 Temp 98.9 °F (37.2 °C) (Tympanic) Resp 16 Ht 5' 4\" (1.626 m) Wt 254 lb (115.2 kg) SpO2 96% BMI 43.60 kg/m² General: No acute distress. Eyes: Sclera anicteric. ENT: No oral lesions. Thyroid normal. 
Nodes: No adenopathy. Skin: No spider angiomata. No jaundice. No palmar erythema. Respiratory: Lungs clear to auscultation. Cardiovascular: Regular heart rate. No murmurs. No JVD. Abdomen: Soft non-tender. Liver size normal to percussion/palpation. Spleen not palpable. No obvious ascites. Extremities: No edema. No muscle wasting. No gross arthritic changes. Neurologic: Alert and oriented. Cranial nerves grossly intact. No asterixis. LABORATORY STUDIES: 
Liver [place] of [address] Units 4/20/2018 4/13/2018 WBC 4.0 - 11.0 1000/mm3 9.7 4.0 HGB 13.0 - 17.2 gm/dl 12.5 (L) 12.9 (L)  - 450 1000/mm3 236 124 (L) INR 0.1 - 1.1    1.2 (H) AST 15 - 37 U/L 37 39 (H) ALT 12 - 78 U/L 30 31 Alk Phos 45 - 117 U/L 93 114 Bili, Total 0.2 - 1.0 mg/dl 1.2 (H) 0.4 Albumin 3.4 - 5.0 gm/dl 3.0 (L) 3.6 BUN 7 - 25 mg/dl 22 15 Creat 0.6 - 1.3 mg/dl 1.0 0.9 Na 136 - 145 mEq/L 136 139  
K 3.5 - 5.1 mEq/L 4.7 3.9 Cl 98 - 107 mEq/L 102 106 CO2 21 - 32 mEq/L 26 27 Glucose 74 - 106 mg/dl 173 (H) 163 (H) SEROLOGIES: 
Serologies Latest Ref Rng & Units 6/22/2017 Ferritin 8 - 388 NG/ML 4 (L) Iron % Saturation 15 - 55 % 5 (L) LIVER HISTOLOGY: 
Not available or performed ENDOSCOPIC PROCEDURES: 
Not available or performed RADIOLOGY: 
Not available or performed OTHER TESTING: 
Not available or performed FOLLOW-UP: 
All of the issues listed above in the Assessment and Plan were discussed with the patient. All questions were answered. The patient expressed a clear understanding of the above. [address] [place] [address] in 4 weeks to review all data and determine the treatment plan. Austin Monaco MD 
[address] [facility], [address] [facility], [address] 
[phone] [address] [place] [address]

## 2018-11-07 NOTE — PROGRESS NOTES
Nereyda Verde is a 61 y.o. female 1. Have you been to the ER, urgent care clinic or hospitalized since your last visit? NO.  
 
2. Have you seen or consulted any other health care providers outside of the [address] [place] [address] since your last visit (Include any pap smears or colon screening)? NO Learning Assessment 11/7/2018 PRIMARY LEARNER Patient HIGHEST LEVEL OF EDUCATION - PRIMARY LEARNER  -  
BARRIERS PRIMARY LEARNER NONE  
CO-LEARNER CAREGIVER No  
PRIMARY LANGUAGE ENGLISH  
LEARNER PREFERENCE PRIMARY LISTENING  
ANSWERED BY PATIENT  
RELATIONSHIP SELF

## 2019-02-17 PROBLEM — K74.60 CIRRHOSIS (HCC): Status: ACTIVE | Noted: 2019-02-17

## 2019-02-17 PROBLEM — K74.5 BILIARY CIRRHOSIS (HCC): Status: ACTIVE | Noted: 2019-02-17

## 2019-02-17 PROBLEM — K75.81 NASH (NONALCOHOLIC STEATOHEPATITIS): Status: ACTIVE | Noted: 2019-02-17

## 2019-02-20 ENCOUNTER — TELEPHONE (OUTPATIENT)
Dept: HEMATOLOGY | Age: 64
End: 2019-02-20

## 2019-02-20 NOTE — TELEPHONE ENCOUNTER
----- Message from Nic Mar MD sent at 2/17/2019  9:31 AM EST -----  Patient did not get labs or return after initial office visit. Please call and let her know she has cirrhosis of the liver and needs monitoring and screening for cancer. Schedule follow-up with MLS as soon as she wants to come back in.   MLS

## 2019-02-20 NOTE — TELEPHONE ENCOUNTER
Attempted to call patient 2x to inform her that she needs a f/u appt. Unable to reach. Tried both numbers.